# Patient Record
Sex: MALE | Race: BLACK OR AFRICAN AMERICAN | NOT HISPANIC OR LATINO | Employment: FULL TIME | URBAN - METROPOLITAN AREA
[De-identification: names, ages, dates, MRNs, and addresses within clinical notes are randomized per-mention and may not be internally consistent; named-entity substitution may affect disease eponyms.]

---

## 2018-10-16 ENCOUNTER — MEDICAL CORRESPONDENCE (OUTPATIENT)
Dept: HEALTH INFORMATION MANAGEMENT | Facility: CLINIC | Age: 43
End: 2018-10-16

## 2018-11-15 ENCOUNTER — TRANSFERRED RECORDS (OUTPATIENT)
Dept: HEALTH INFORMATION MANAGEMENT | Facility: CLINIC | Age: 43
End: 2018-11-15

## 2019-04-08 ENCOUNTER — MEDICAL CORRESPONDENCE (OUTPATIENT)
Dept: HEALTH INFORMATION MANAGEMENT | Facility: CLINIC | Age: 44
End: 2019-04-08

## 2019-04-08 ENCOUNTER — TRANSFERRED RECORDS (OUTPATIENT)
Dept: HEALTH INFORMATION MANAGEMENT | Facility: CLINIC | Age: 44
End: 2019-04-08

## 2019-04-18 ENCOUNTER — TRANSFERRED RECORDS (OUTPATIENT)
Dept: HEALTH INFORMATION MANAGEMENT | Facility: CLINIC | Age: 44
End: 2019-04-18

## 2019-04-19 ENCOUNTER — TRANSFERRED RECORDS (OUTPATIENT)
Dept: HEALTH INFORMATION MANAGEMENT | Facility: CLINIC | Age: 44
End: 2019-04-19

## 2019-04-25 ENCOUNTER — MEDICAL CORRESPONDENCE (OUTPATIENT)
Dept: HEALTH INFORMATION MANAGEMENT | Facility: CLINIC | Age: 44
End: 2019-04-25

## 2019-05-01 ENCOUNTER — PRE VISIT (OUTPATIENT)
Dept: NEUROSURGERY | Facility: CLINIC | Age: 44
End: 2019-05-01

## 2019-05-01 NOTE — TELEPHONE ENCOUNTER
"NEUROSURGERY PRE-VISIT DATA  NEUROSURGERY PRE-VISIT DATA  ON THE PHONE CALL     Date of call 05/01/2019   Date of appointment 05/09/2019   Reason for referral (match appointment comment) LBP   Who made the initial call (patient, parent [name], referral source RIGHTFAX   Name of referring provider DR. MARILYN WATSON   Has the patient been seen for the referring problem?   If yes, by whom and where    Where and what tests have been done?      Previous surgeries for the  referred condition\"?  If yes, by whom and where  *Request operative reports(s).      REQUEST MEDICAL RECORDS     From where? [facility]   From whom (Specify Primary, Neurology, Neurosurgery, Orthopedics, ENT, DDS, Pain Mgt, Medical Oncologist, Radiation Oncologist)    Date of request ? 04/08/2019   Who did you speak to? FAXED REQUEST   Are records already in [facility]?  NO   Have pertinent, outside records received?  (OR notes, study reports and provider notes) YES     IMAGES and TESTS:     Has the patient had any images done? YES   What images? (Specify MRI, CT, X-ray, Bone scan, MRA/MRV, etc)  * Include When and where? YES   What tests? (EMG, EEG, lumbar puncture, etc)  *Include when and where      Are the images in PACS? YES   If not already in PACS, have images been pushed to PAC and when?        "

## 2019-05-07 ENCOUNTER — TRANSFERRED RECORDS (OUTPATIENT)
Dept: HEALTH INFORMATION MANAGEMENT | Facility: CLINIC | Age: 44
End: 2019-05-07

## 2019-06-24 ENCOUNTER — OFFICE VISIT (OUTPATIENT)
Dept: NEUROSURGERY | Facility: CLINIC | Age: 44
End: 2019-06-24
Payer: COMMERCIAL

## 2019-06-24 VITALS
BODY MASS INDEX: 28.64 KG/M2 | RESPIRATION RATE: 16 BRPM | DIASTOLIC BLOOD PRESSURE: 90 MMHG | WEIGHT: 189 LBS | OXYGEN SATURATION: 96 % | TEMPERATURE: 98 F | HEIGHT: 68 IN | HEART RATE: 66 BPM | SYSTOLIC BLOOD PRESSURE: 138 MMHG

## 2019-06-24 DIAGNOSIS — M54.50 CHRONIC MIDLINE LOW BACK PAIN WITHOUT SCIATICA: ICD-10-CM

## 2019-06-24 DIAGNOSIS — G89.29 CHRONIC MIDLINE LOW BACK PAIN WITHOUT SCIATICA: ICD-10-CM

## 2019-06-24 PROBLEM — S42.132D: Status: ACTIVE | Noted: 2019-04-24

## 2019-06-24 RX ORDER — IBUPROFEN 600 MG/1
TABLET, FILM COATED ORAL
COMMUNITY
Start: 2019-04-18

## 2019-06-24 RX ORDER — EPINEPHRINE 0.3 MG/.3ML
INJECTION SUBCUTANEOUS
Refills: 6 | COMMUNITY
Start: 2019-03-12

## 2019-06-24 RX ORDER — AMLODIPINE BESYLATE 5 MG/1
TABLET ORAL
Refills: 12 | COMMUNITY
Start: 2018-10-30 | End: 2023-09-22

## 2019-06-24 RX ORDER — AMLODIPINE BESYLATE 10 MG/1
TABLET ORAL
Refills: 12 | COMMUNITY
Start: 2018-12-30

## 2019-06-24 RX ORDER — ATORVASTATIN CALCIUM 20 MG/1
TABLET, FILM COATED ORAL
Refills: 12 | COMMUNITY
Start: 2018-12-30

## 2019-06-24 ASSESSMENT — ENCOUNTER SYMPTOMS
NIGHT SWEATS: 1
WHEEZING: 0
SNORES LOUDLY: 0
CHILLS: 0
WEIGHT GAIN: 0
ALTERED TEMPERATURE REGULATION: 0
HALLUCINATIONS: 0
BACK PAIN: 1
WEIGHT LOSS: 0
COUGH: 1
HEMOPTYSIS: 0
ARTHRALGIAS: 0
JOINT SWELLING: 0
DYSPNEA ON EXERTION: 0
FATIGUE: 0
MUSCLE WEAKNESS: 0
POSTURAL DYSPNEA: 0
NECK PAIN: 0
FEVER: 0
MUSCLE CRAMPS: 0
DECREASED APPETITE: 0
SPUTUM PRODUCTION: 0
POLYDIPSIA: 0
INCREASED ENERGY: 0
MYALGIAS: 1
SHORTNESS OF BREATH: 0
STIFFNESS: 1
COUGH DISTURBING SLEEP: 1

## 2019-06-24 ASSESSMENT — MIFFLIN-ST. JEOR: SCORE: 1736.8

## 2019-06-24 ASSESSMENT — PAIN SCALES - GENERAL: PAINLEVEL: MODERATE PAIN (4)

## 2019-06-24 NOTE — NURSING NOTE
Chief Complaint   Patient presents with     Back Pain     [facility] NEW- LBP CONSULTATION       Torsten Manzanares, EMT

## 2019-06-24 NOTE — PROGRESS NOTES
"6/24/2019     Reason for visit: Back pain    History of present illness:  7 years ago accidential finding of lumbar vertebrae fusion. A biopsy of the the right lumbar vertebrae L3-L4 revealed bony tissue.  Today, he reports pain mostly localized to the lower back. He also reports morning stiffness and problems bending forward due to restriction of motion and pain. Occasional pain shooting down his legs b/l. He also reports muscle spasms.    He is taking ibuprofen, a muscle and smokes cannabis to alleviate back pain.     Denies numbness, tingling, weakness, changes in his bladder or bowel function.    Surgical History: Biospy, wrist surgery, ACL surgery right     Review of systems: 10 point ROS negative except for as detailed in HPI    Physical exam:   /90   Pulse 66   Temp 98  F (36.7  C) (Oral)   Resp 16   Ht 1.727 m (5' 8\")   Wt 85.7 kg (189 lb)   SpO2 96%   BMI 28.74 kg/m      General: Awake and alert and in no acute distress.  Pulm: Breathing comfortably on room air  CN: Symmetric browlift, smile, tongue protrusion, palate elevation, and sternocleidomastoids. No dysarthria. Extraocular muscles are all intact. Pupils react bilaterally and equally  Coordination: Intact finger-nose-finger bilaterally. Symmetric rapid alternating movements in bilateral upper extremities   Motor: No pronator drift. Good muscle bulk throughout. 5 out of 5 strength in bilateral upper and lower extremities  Gait: Intact tandem gait.    Motor:  Normal bulk / tone; no tremor, rigidity, or bradykinesia.  No muscle wasting or fasciculations  No Pronator Drift       Delt Bi Tri Hand Flexion/  Extension Iliopsoas Quadriceps Hamstrings Tibialis Anterior Gastroc     C5 C6 C7 C8/T1 L2 L3 L4-S1 L4 S1   R 5 5 5 5 5 5 5 5 5   L 5 5 5 5 5 5 5 5 5   Sensory:  intact to LT x 4 extremities, except numbness/tingling in right lateral leg & foot     Reflexes:       Bi Tri BR Angie Pat Ach Bab     C5-6 C7-8 C6 UMN L2-4 S1 UMN   R 2+ 2+ 2+ Norm " 1+ 1+ Norm   L 2+ 2+ 2+ Norm 1+ 1+ Norm        Imaging:  No new imaging. Thoracolumbar CT shows bony fusion of L2-L4 on the right.    Assessment:  42 y/o M w/ history of L3-4 vertebrae spontaneous fusion with increasing morning stiffness and lower back pain but neurologically intact.     Plan:  - no indication for neurosurgical intervention  - recommend to follow up with pain management clinic through his PCP      Patient seen and discussed with MD Girish Palacio MD,  Neurosurgery, PGY-1    I saw the patient with the resident.  I have reviewed and edited the resident note and agree with the plan of care.      Marty Arroyo MD       Answers for HPI/ROS submitted by the patient on 6/24/2019   General Symptoms: Yes  Skin Symptoms: No  HENT Symptoms: No  EYE SYMPTOMS: No  HEART SYMPTOMS: No  LUNG SYMPTOMS: Yes  INTESTINAL SYMPTOMS: No  URINARY SYMPTOMS: No  REPRODUCTIVE SYMPTOMS: No  SKELETAL SYMPTOMS: Yes  BLOOD SYMPTOMS: No  NERVOUS SYSTEM SYMPTOMS: No  MENTAL HEALTH SYMPTOMS: No  Fever: No  Loss of appetite: No  Weight loss: No  Weight gain: No  Fatigue: No  Night sweats: Yes  Chills: No  Increased stress: No  Excessive thirst: No  Feeling hot or cold when others believe the temperature is normal: No  Loss of height: No  Post-operative complications: No  Surgical site pain: No  Hallucinations: No  Change in or Loss of Energy: No  Hyperactivity: No  Confusion: No  Cough: Yes  Sputum or phlegm: No  Coughing up blood: No  Difficulty breating or shortness of breath: No  Snoring: No  Wheezing: No  Difficulty breathing on exertion: No  Nighttime Cough: Yes  Difficulty breathing when lying flat: No  Back pain: Yes  Muscle aches: Yes  Neck pain: No  Swollen joints: No  Joint pain: No  Bone pain: No  Muscle cramps: No  Muscle weakness: No  Joint stiffness: Yes  Bone fracture: No

## 2019-06-24 NOTE — LETTER
"6/24/2019       RE: Dayday Hester  [address]  [place] [facility] [address]     Dear Colleague,    Thank you for referring your patient, Dayday Hester, to the [facility] NEUROSURGERY at [facility]. Please see a copy of my visit note below.    6/24/2019     Reason for visit: Back pain    History of present illness:  7 years ago accidential finding of lumbar vertebrae fusion. A biopsy of the the right lumbar vertebrae L3-L4 revealed bony tissue.  Today, he reports pain mostly localized to the lower back. He also reports morning stiffness and problems bending forward due to restriction of motion and pain. Occasional pain shooting down his legs b/l. He also reports muscle spasms.    He is taking ibuprofen, a muscle and smokes cannabis to alleviate back pain.     Denies numbness, tingling, weakness, changes in his bladder or bowel function.    Surgical History: Biospy, wrist surgery, ACL surgery right     Review of systems: 10 point ROS negative except for as detailed in HPI    Physical exam:   /90   Pulse 66   Temp 98  F (36.7  C) (Oral)   Resp 16   Ht 1.727 m (5' 8\")   Wt 85.7 kg (189 lb)   SpO2 96%   BMI 28.74 kg/m       General: Awake and alert and in no acute distress.  Pulm: Breathing comfortably on room air  CN: Symmetric browlift, smile, tongue protrusion, palate elevation, and sternocleidomastoids. No dysarthria. Extraocular muscles are all intact. Pupils react bilaterally and equally  Coordination: Intact finger-nose-finger bilaterally. Symmetric rapid alternating movements in bilateral upper extremities   Motor: No pronator drift. Good muscle bulk throughout. 5 out of 5 strength in bilateral upper and lower extremities  Gait: Intact tandem gait.    Motor:  Normal bulk / tone; no tremor, rigidity, or bradykinesia.  No muscle wasting or fasciculations  No Pronator Drift       Delt Bi Tri Hand Flexion/  Extension Iliopsoas Quadriceps Hamstrings Tibialis " Anterior Gastroc     C5 C6 C7 C8/T1 L2 L3 L4-S1 L4 S1   R 5 5 5 5 5 5 5 5 5   L 5 5 5 5 5 5 5 5 5   Sensory:  intact to LT x 4 extremities, except numbness/tingling in right lateral leg & foot     Reflexes:       Bi Tri BR Angie Pat Ach Bab     C5-6 C7-8 C6 UMN L2-4 S1 UMN   R 2+ 2+ 2+ Norm 1+ 1+ Norm   L 2+ 2+ 2+ Norm 1+ 1+ Norm        Imaging:  No new imaging. Thoracolumbar CT shows bony fusion of L2-L4 on the right.    Assessment:  42 y/o M w/ history of L3-4 vertebrae spontaneous fusion with increasing morning stiffness and lower back pain but neurologically intact.     Plan:  - no indication for neurosurgical intervention  - recommend to follow up with pain management clinic through his PCP    Patient seen and discussed with MD Girish Palacio MD,  Neurosurgery, PGY-1    I saw the patient with the resident.  I have reviewed and edited the resident note and agree with the plan of care.      Marty Arroyo MD

## 2020-03-17 ENCOUNTER — TRANSFERRED RECORDS (OUTPATIENT)
Dept: HEALTH INFORMATION MANAGEMENT | Facility: CLINIC | Age: 45
End: 2020-03-17

## 2020-03-23 ENCOUNTER — TRANSCRIBE ORDERS (OUTPATIENT)
Dept: OTHER | Age: 45
End: 2020-03-23

## 2020-03-23 ENCOUNTER — TELEPHONE (OUTPATIENT)
Dept: ORTHOPEDICS | Facility: CLINIC | Age: 45
End: 2020-03-23

## 2020-03-23 DIAGNOSIS — M25.512 SHOULDER PAIN, LEFT: Primary | ICD-10-CM

## 2020-03-23 NOTE — TELEPHONE ENCOUNTER
"[facility] Call Center    Phone Message    May a detailed message be left on voicemail: yes     Reason for Call: Patient calling in to schedule a referral placed by his PCP for \"broken left shoulder bone\". Writer did not see referral placed along with no imaging in chart. Patient stating that he completed Xrays at [facility] on 3/17/2020. Per Scott on priority line we need images released to us so we are able to view images as we are only seeing severe patient's at this time due to COVID-19 protocols. Writer relayed message to patient and patient asking if he was able to complete images at our location today as he was wanting to schedule surgery. Writer informed caller that he would need to call [facility] to release those images to us so we could review those first. Caller hung up on writer middle of call. FYI Ortho.    Thank you, Cristine Santana on 3/23/2020 at 9:19 AM       Action Taken: Message routed to:  [facility] ORTHO    Travel Screening: Not Applicable                                                                      "

## 2020-03-24 ENCOUNTER — TELEPHONE (OUTPATIENT)
Dept: ORTHOPEDICS | Facility: CLINIC | Age: 45
End: 2020-03-24

## 2020-03-24 NOTE — TELEPHONE ENCOUNTER
I called patient to let him know that he can be scheduled for a telephone visit with Dr. Berger tomorrow at 1:30pm. The patient stated that 1:30pm works for him and he can be reached via [phone]. The patient stated that he understood and had no further questions.     ANNA Bell

## 2020-03-25 ENCOUNTER — OFFICE VISIT (OUTPATIENT)
Dept: ORTHOPEDICS | Facility: CLINIC | Age: 45
End: 2020-03-25
Payer: COMMERCIAL

## 2020-03-25 ENCOUNTER — ANCILLARY PROCEDURE (OUTPATIENT)
Dept: GENERAL RADIOLOGY | Facility: CLINIC | Age: 45
End: 2020-03-25
Attending: PREVENTIVE MEDICINE
Payer: COMMERCIAL

## 2020-03-25 ENCOUNTER — VIRTUAL VISIT (OUTPATIENT)
Dept: ORTHOPEDICS | Facility: CLINIC | Age: 45
End: 2020-03-25
Payer: COMMERCIAL

## 2020-03-25 VITALS
SYSTOLIC BLOOD PRESSURE: 138 MMHG | HEIGHT: 70 IN | BODY MASS INDEX: 28.49 KG/M2 | DIASTOLIC BLOOD PRESSURE: 89 MMHG | WEIGHT: 199 LBS | HEART RATE: 62 BPM

## 2020-03-25 DIAGNOSIS — M54.2 NECK PAIN: ICD-10-CM

## 2020-03-25 DIAGNOSIS — M25.512 ACUTE PAIN OF LEFT SHOULDER: Primary | ICD-10-CM

## 2020-03-25 DIAGNOSIS — S43.102A AC SEPARATION, LEFT, INITIAL ENCOUNTER: Primary | ICD-10-CM

## 2020-03-25 DIAGNOSIS — M25.512 ACUTE PAIN OF LEFT SHOULDER: ICD-10-CM

## 2020-03-25 DIAGNOSIS — R20.0 NUMBNESS OF LEFT HAND: ICD-10-CM

## 2020-03-25 DIAGNOSIS — M54.12 CERVICAL RADICULAR PAIN: ICD-10-CM

## 2020-03-25 RX ORDER — LIDOCAINE 4 G/G
1 PATCH TOPICAL EVERY 24 HOURS
Qty: 6 PATCH | Refills: 3 | Status: SHIPPED | OUTPATIENT
Start: 2020-03-25 | End: 2023-09-22

## 2020-03-25 RX ORDER — GABAPENTIN 300 MG/1
300 CAPSULE ORAL 3 TIMES DAILY
Qty: 90 CAPSULE | Refills: 1 | Status: SHIPPED | OUTPATIENT
Start: 2020-03-25

## 2020-03-25 RX ORDER — METHYLPREDNISOLONE 4 MG
TABLET, DOSE PACK ORAL
Qty: 21 TABLET | Refills: 0 | Status: SHIPPED | OUTPATIENT
Start: 2020-03-25 | End: 2023-09-22

## 2020-03-25 ASSESSMENT — MIFFLIN-ST. JEOR: SCORE: 1800.97
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
## 2020-03-25 NOTE — LETTER
March 25, 2020      RE: Dayday Hester  [address]  [address]  [place] [address]        To whom it may concern:    Dayday Hester is under my professional care for    Acute pain of left shoulder  Numbness of left hand  Neck pain  Cervical radicular pain He  may return to work with the following: The employee is UNABLE to return to work until I re-evaluate him on or about     When the patient returns to work, the following restrictions apply until April 7th, 2020:  NO Reach Above Shoulders: Not at all (0 hours)  NO Lift, carry, push, and pull   Unable to perform any physical work.      Sincerely,      Minor Berger MD

## 2020-03-25 NOTE — TELEPHONE ENCOUNTER
Patient has a telephone visit with Dr Berger today.  Images will be reviewed and treatment plan will be discussed.
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
## 2020-03-31 ENCOUNTER — VIRTUAL VISIT (OUTPATIENT)
Dept: ORTHOPEDICS | Facility: CLINIC | Age: 45
End: 2020-03-31
Payer: COMMERCIAL

## 2020-03-31 DIAGNOSIS — M54.50 CHRONIC MIDLINE LOW BACK PAIN WITHOUT SCIATICA: Primary | ICD-10-CM

## 2020-03-31 DIAGNOSIS — G89.29 CHRONIC MIDLINE LOW BACK PAIN WITHOUT SCIATICA: Primary | ICD-10-CM

## 2020-03-31 DIAGNOSIS — S43.102S AC SEPARATION, LEFT, SEQUELA: ICD-10-CM

## 2020-03-31 NOTE — PROGRESS NOTES
"Dayday Hester is a 44 year old male who is being evaluated via a billable telephone visit.      The patient has been notified of following:   After review of patient's medical issues this visit was conducted over the phone, as opposed to in person, in effort to reduce risk of COVID-19 exposure.  \"This telephone visit will be conducted via a call between you and your physician/provider. We have found that certain health care needs can be provided without the need for a physical exam.  This service lets us provide the care you need with a short phone conversation.  If a prescription is necessary we can send it directly to your pharmacy.  If lab work is needed we can place an order for that and you can then stop by our lab to have the test done at a later time.    If during the course of the call the physician/provider feels a telephone visit is not appropriate, you will not be charged for this service.\"     Patient has given verbal consent for Telephone visit?  Yes    Dayday Hester complains of  Shoulder pain and tingling in arm  Chief Complaint   Patient presents with     RECHECK     L shoulder follow up       I have reviewed and updated the patient's Past Medical History, Social History, Family History and Medication List.    ALLERGIES  Bee venom; Bees; and Food    Additional provider notes:  Dayday is following up for his left shoulder pain and neck pain and tingling and numbness in left arm  He states that the medications I prescribed are helping, but still having discomfort  He wants to know when he should followup and consider returning to work      43 yo male with left shoulder AC separation, and left arm radicular pain from neck cervical disc herniation  Given voltaren bid  Start medrol pack, gabapentin and tizanadine, has not picked up medications yet  F/u in person next week for exam and possible AC joint injection    Phone call duration: 11 minutes    Phone call start time: 5:05pm  Phone call end " time: 5:16pm      Minor Berger MD

## 2020-04-02 RX ORDER — DICLOFENAC SODIUM 75 MG/1
75 TABLET, DELAYED RELEASE ORAL 2 TIMES DAILY
Qty: 40 TABLET | Refills: 1 | Status: SHIPPED | OUTPATIENT
Start: 2020-04-02

## 2021-05-29 ENCOUNTER — RECORDS - HEALTHEAST (OUTPATIENT)
Dept: ADMINISTRATIVE | Facility: CLINIC | Age: 46
End: 2021-05-29

## 2021-06-18 DIAGNOSIS — M54.2 NECK PAIN: ICD-10-CM

## 2021-06-18 DIAGNOSIS — M54.12 CERVICAL RADICULAR PAIN: ICD-10-CM

## 2021-06-18 NOTE — TELEPHONE ENCOUNTER
methylPREDNISolone (MEDROL DOSEPAK) 4 MG tablet therapy pack 21 tablet 0 3/25/2020  --   Sig: Follow Package Directions   Sent to pharmacy as: methylPREDNISolone (MEDROL DOSEPAK) 4 MG tablet therapy pack   Class: E-Prescribe   Order: [id number]   E-Prescribing Status: Receipt confirmed by pharmacy (3/25/2020  3:43 PM CDT)      Disp Refills Start End ANGELIKA   tiZANidine (ZANAFLEX) 4 MG tablet 45 tablet 1 3/25/2020  --   Sig - Route: Take 1-2 tablets (4-8 mg) by mouth nightly as needed for muscle spasms - Oral   Sent to pharmacy as: tiZANidine (ZANAFLEX) 4 MG tablet   Class: E-Prescribe   Order: [id number]   E-Prescribing Status: Receipt confirmed by pharmacy (3/25/2020  3:43 PM CDT)

## 2021-07-14 RX ORDER — METHYLPREDNISOLONE 4 MG
TABLET, DOSE PACK ORAL
Qty: 21 TABLET | Refills: 0 | OUTPATIENT
Start: 2021-07-14

## 2021-07-14 NOTE — TELEPHONE ENCOUNTER
Needs appointment, for refills,  not sure if he even picked up these medications in the first place. Thanks

## 2022-06-16 ENCOUNTER — TELEPHONE (OUTPATIENT)
Dept: DERMATOLOGY | Facility: CLINIC | Age: 47
End: 2022-06-16
Payer: COMMERCIAL

## 2022-06-16 NOTE — TELEPHONE ENCOUNTER
Called back medical records at clinic to let them know that we need records related to dermatology issues. Had to leave message with pt info for them to fax to our clinic.   Faustina LI RN BSN PHN  Specialty Clinics

## 2022-06-16 NOTE — TELEPHONE ENCOUNTER
[facility] Call Center    Phone Message    May a detailed message be left on voicemail: yes     Reason for Call: Pt was scheduled for a virtual Appt and was asked to send over medical Recs.   Laura from [facility] is asking what Recs needs to be sent over?   Please call Laura back between 8-5. Thanks     Action Taken: Message routed to:  [facility] ([facility] Derm    Travel Screening: Not Applicable

## 2022-07-30 ENCOUNTER — HEALTH MAINTENANCE LETTER (OUTPATIENT)
Age: 47
End: 2022-07-30

## 2022-10-10 ENCOUNTER — HEALTH MAINTENANCE LETTER (OUTPATIENT)
Age: 47
End: 2022-10-10

## 2022-11-21 ENCOUNTER — OFFICE VISIT (OUTPATIENT)
Dept: DERMATOLOGY | Facility: CLINIC | Age: 47
End: 2022-11-21
Payer: COMMERCIAL

## 2022-11-21 DIAGNOSIS — L80 VITILIGO: Primary | ICD-10-CM

## 2022-11-21 PROCEDURE — 99203 OFFICE O/P NEW LOW 30 MIN: CPT | Performed by: DERMATOLOGY

## 2022-11-21 RX ORDER — HYDROCORTISONE 2.5 %
CREAM (GRAM) TOPICAL 2 TIMES DAILY
Qty: 30 G | Refills: 4 | Status: SHIPPED | OUTPATIENT
Start: 2022-11-21

## 2022-11-21 ASSESSMENT — PAIN SCALES - GENERAL: PAINLEVEL: NO PAIN (0)

## 2022-11-21 NOTE — PROGRESS NOTES
[facility] Dermatology Note    Encounter Date: Nov 21, 2022    Dermatology Problem List:  1. Vitiligo: affecting right forehead, brow, eyelid with poliosis  - hydrocortisone 2.5% cream  - in reserve: CNi, Excimer/nbUVB, ruxolitinib  ______________________________________    Impression/Plan:  1. Vitiligo: affecting right forehead, brow, eyelid with poliosis of eyebrow. Discussed what is know about pathophysiology and natural history, including unpredictability of exacerbations/progression. Discussed risks/benefits of treatment including topicals, phototherapy, and systemic agents. Will start with topicals.  - hydrocortisone 2.5% cream BID      Follow-up in 2-3 months.       Staff Involved:  Staff Only    Minor Moulton MD   of Dermatology  Department of Dermatology  [facility]      CC:   Chief Complaint   Patient presents with     Derm Problem     Dayday is here today for vitiligo of the right eye lid and forehead        History of Present Illness:  Mr. Dayday Hester is a 47 year old male who presents as a new patient.    Discoloration - white spots - started on forehead and eyelid 2 years ago  - then more spots, then grew together  - history of eczema when younger - this went away as grew older  - very sensitive - sunburned in area - then dry and hard, becomes red - becomes firm and scabs, peels  - no prior treatment    Prior tinea versicolor - treated with sulfur wash    Labs:  N/A    Physical exam:  Vitals: There were no vitals taken for this visit.  GEN: This is a well developed, well-nourished male in no acute distress, in a pleasant mood.    SKIN: Flaherty phototype V  - Focused examination of the face was performed.  - depigmented patch on the right medial forehead with extension over the brow (with poliosis) and onto the upper eyelid  - No other lesions of concern on areas examined.     Past Medical History:   No past medical  history on file.  No past surgical history on file.    Social History:   reports that he has been smoking cigarettes. He has been smoking an average of 1 pack per day. He has never used smokeless tobacco. He reports that he does not currently use alcohol. He reports that he does not currently use drugs.    Family History:  No family history on file.    Medications:  Current Outpatient Medications   Medication Sig Dispense Refill     amLODIPine (NORVASC) 10 MG tablet TAKE 1 TABLET BY MOUTH EVERY DAY FOR BLOOD PRESSURE  12     amLODIPine (NORVASC) 5 MG tablet TAKE 1 TABLET BY MOUTH EVERY DAY FOR BLOOD PRESSURE  12     Amphetamine-Dextroamphetamine (ADDERALL PO) Take 25 mg by mouth daily       atorvastatin (LIPITOR) 20 MG tablet TAKE 1 TABLET BY MOUTH DAILY FOR CHOLESTEROL.  12     diclofenac (VOLTAREN) 75 MG EC tablet Take 1 tablet (75 mg) by mouth 2 times daily 40 tablet 1     EPINEPHrine (EPIPEN/ADRENACLICK/OR ANY BX GENERIC EQUIV) 0.3 MG/0.3ML injection 2-pack INJECT 1 PEN AS NEEDED FOR ALLERGIC REACTION. MAY REPEAT ONCE IN 5 MONUTES IF SYMPTOMS PERSIST  6     gabapentin (NEURONTIN) 300 MG capsule Take 1 capsule (300 mg) by mouth 3 times daily 90 capsule 1     ibuprofen (ADVIL/MOTRIN) 600 MG tablet        Lidocaine (LIDOCARE) 4 % Patch Place 1 patch onto the skin every 24 hours To prevent lidocaine toxicity, patient should be patch free for 12 hrs daily. 6 patch 3     lidocaine (LIDODERM) 5 % patch Place 1 patch onto the skin every 24 hours May substitute Lidocaine topical ointment if PA needed or denied. (Patient not taking: Reported on 6/24/2019) 30 patch 0     methylPREDNISolone (MEDROL DOSEPAK) 4 MG tablet therapy pack Follow Package Directions 21 tablet 0     tiZANidine (ZANAFLEX) 4 MG tablet Take 1-2 tablets (4-8 mg) by mouth nightly as needed for muscle spasms 45 tablet 1     UNABLE TO FIND MEDICATION NAME: High blood pressure medication. 25 mg, 2 tablets QD.       Allergies   Allergen Reactions     Bee  Venom Anaphylaxis     Bees      Food Rash

## 2022-11-21 NOTE — PATIENT INSTRUCTIONS
[email or web address]    [email or web address]    [employer] Vitiligo Support Group  [place] Chapter Leader  email: [email or web address]  [email or web address]  [email or web address]  [phone] ext [phone]

## 2022-11-21 NOTE — LETTER
11/21/2022       RE: Dayday Hester  [address]  [facility] [address]     Dear Colleague,    Thank you for referring your patient, Dayday Hester, to the [facility] DERMATOLOGY CLINIC [place] at [facility]. Please see a copy of my visit note below.    [facility] Dermatology Note    Encounter Date: Nov 21, 2022    Dermatology Problem List:  1. Vitiligo: affecting right forehead, brow, eyelid with poliosis  - hydrocortisone 2.5% cream  - in reserve: CNi, Excimer/nbUVB, ruxolitinib  ______________________________________    Impression/Plan:  1. Vitiligo: affecting right forehead, brow, eyelid with poliosis of eyebrow. Discussed what is know about pathophysiology and natural history, including unpredictability of exacerbations/progression. Discussed risks/benefits of treatment including topicals, phototherapy, and systemic agents. Will start with topicals.  - hydrocortisone 2.5% cream BID      Follow-up in 2-3 months.       Staff Involved:  Staff Only    Minor Moulton MD   of Dermatology  Department of Dermatology  [facility]      CC:   Chief Complaint   Patient presents with     Derm Problem     Dayday is here today for vitiligo of the right eye lid and forehead        History of Present Illness:  Mr. Dayday Hester is a 47 year old male who presents as a new patient.    Discoloration - white spots - started on forehead and eyelid 2 years ago  - then more spots, then grew together  - history of eczema when younger - this went away as grew older  - very sensitive - sunburned in area - then dry and hard, becomes red - becomes firm and scabs, peels  - no prior treatment    Prior tinea versicolor - treated with sulfur wash    Labs:  N/A    Physical exam:  Vitals: There were no vitals taken for this visit.  GEN: This is a well developed, well-nourished male in no acute  distress, in a pleasant mood.    SKIN: Flaherty phototype V  - Focused examination of the face was performed.  - depigmented patch on the right medial forehead with extension over the brow (with poliosis) and onto the upper eyelid  - No other lesions of concern on areas examined.     Past Medical History:   No past medical history on file.  No past surgical history on file.    Social History:   reports that he has been smoking cigarettes. He has been smoking an average of 1 pack per day. He has never used smokeless tobacco. He reports that he does not currently use alcohol. He reports that he does not currently use drugs.    Family History:  No family history on file.    Medications:  Current Outpatient Medications   Medication Sig Dispense Refill     amLODIPine (NORVASC) 10 MG tablet TAKE 1 TABLET BY MOUTH EVERY DAY FOR BLOOD PRESSURE  12     amLODIPine (NORVASC) 5 MG tablet TAKE 1 TABLET BY MOUTH EVERY DAY FOR BLOOD PRESSURE  12     Amphetamine-Dextroamphetamine (ADDERALL PO) Take 25 mg by mouth daily       atorvastatin (LIPITOR) 20 MG tablet TAKE 1 TABLET BY MOUTH DAILY FOR CHOLESTEROL.  12     diclofenac (VOLTAREN) 75 MG EC tablet Take 1 tablet (75 mg) by mouth 2 times daily 40 tablet 1     EPINEPHrine (EPIPEN/ADRENACLICK/OR ANY BX GENERIC EQUIV) 0.3 MG/0.3ML injection 2-pack INJECT 1 PEN AS NEEDED FOR ALLERGIC REACTION. MAY REPEAT ONCE IN 5 MONUTES IF SYMPTOMS PERSIST  6     gabapentin (NEURONTIN) 300 MG capsule Take 1 capsule (300 mg) by mouth 3 times daily 90 capsule 1     ibuprofen (ADVIL/MOTRIN) 600 MG tablet        Lidocaine (LIDOCARE) 4 % Patch Place 1 patch onto the skin every 24 hours To prevent lidocaine toxicity, patient should be patch free for 12 hrs daily. 6 patch 3     lidocaine (LIDODERM) 5 % patch Place 1 patch onto the skin every 24 hours May substitute Lidocaine topical ointment if PA needed or denied. (Patient not taking: Reported on 6/24/2019) 30 patch 0     methylPREDNISolone (MEDROL  DOSEPAK) 4 MG tablet therapy pack Follow Package Directions 21 tablet 0     tiZANidine (ZANAFLEX) 4 MG tablet Take 1-2 tablets (4-8 mg) by mouth nightly as needed for muscle spasms 45 tablet 1     UNABLE TO FIND MEDICATION NAME: High blood pressure medication. 25 mg, 2 tablets QD.       Allergies   Allergen Reactions     Bee Venom Anaphylaxis     Bees      Food Rash

## 2022-11-21 NOTE — NURSING NOTE
Dermatology Rooming Note    Dayday Hester's goals for this visit include:   Chief Complaint   Patient presents with     Derm Problem     Dayday is here today for vitiligo of the right eye lid and forehead      Roberto Eller CMA

## 2023-08-19 ENCOUNTER — HEALTH MAINTENANCE LETTER (OUTPATIENT)
Age: 48
End: 2023-08-19

## 2023-09-22 ENCOUNTER — HOSPITAL ENCOUNTER (EMERGENCY)
Facility: CLINIC | Age: 48
Discharge: OTHER INSTITUTION | End: 2023-09-22
Attending: EMERGENCY MEDICINE | Admitting: EMERGENCY MEDICINE
Payer: COMMERCIAL

## 2023-09-22 VITALS
TEMPERATURE: 98.2 F | HEIGHT: 68 IN | BODY MASS INDEX: 32.89 KG/M2 | HEART RATE: 86 BPM | OXYGEN SATURATION: 99 % | RESPIRATION RATE: 18 BRPM | WEIGHT: 217 LBS | DIASTOLIC BLOOD PRESSURE: 112 MMHG | SYSTOLIC BLOOD PRESSURE: 178 MMHG

## 2023-09-22 DIAGNOSIS — I10 HYPERTENSION, UNSPECIFIED TYPE: ICD-10-CM

## 2023-09-22 DIAGNOSIS — F43.20 ADJUSTMENT DISORDER, UNSPECIFIED TYPE: ICD-10-CM

## 2023-09-22 DIAGNOSIS — F12.20 TETRAHYDROCANNABINOL (THC) DEPENDENCE (H): ICD-10-CM

## 2023-09-22 DIAGNOSIS — F16.20: ICD-10-CM

## 2023-09-22 LAB
AMPHETAMINES UR QL SCN: ABNORMAL
BARBITURATES UR QL SCN: ABNORMAL
BENZODIAZ UR QL SCN: ABNORMAL
BZE UR QL SCN: ABNORMAL
CANNABINOIDS UR QL SCN: ABNORMAL
FENTANYL UR QL: ABNORMAL
OPIATES UR QL SCN: ABNORMAL
PCP QUAL URINE (ROCHE): ABNORMAL

## 2023-09-22 PROCEDURE — 99284 EMERGENCY DEPT VISIT MOD MDM: CPT | Mod: GC | Performed by: EMERGENCY MEDICINE

## 2023-09-22 PROCEDURE — 80307 DRUG TEST PRSMV CHEM ANLYZR: CPT | Performed by: EMERGENCY MEDICINE

## 2023-09-22 PROCEDURE — 99283 EMERGENCY DEPT VISIT LOW MDM: CPT | Performed by: EMERGENCY MEDICINE

## 2023-09-22 ASSESSMENT — ACTIVITIES OF DAILY LIVING (ADL)
ADLS_ACUITY_SCORE: 35

## 2023-09-22 NOTE — CONSULTS
Attempted to meet with patient.  Upon entering the room patient was lying sleeping in the dark.  Staff attempted to verbally awaken him getting progressively louder to no avail.  Patient appeared to be sleeping very hard.  Patient doesn't appear able to discuss treatment options at this time or complete an assessment at this time.  Consult will be closed at this time.  If patient remains in the hospital/detox by Monday please re-place consult when he is awake and alert.        [facility] department does not do evals on the weekend and schedule is full for today.    Pt will be seen Monday or Tuesday for CD eval. If pt discharges this weekend, pt can call Mental Health and Addiction Services Line: [phone] and make an appt through [employer] for an evaluation and referrals to CD treatment.    PEARL Dsouza on 9/22/2023 at 1:26 PM

## 2023-09-22 NOTE — CONSULTS
Diagnostic Evaluation Consultation  Crisis Assessment    Patient Name: Dayday Hester  Age:  47 year old  Legal Sex: male  Gender Identity: male  Pronouns:   Race: Black or   Ethnicity: Not  or   Language: English      Patient was assessed: In person      Patient location: [facility] EMERGENCY DEPARTMENT                             ED17    Referral Data and Chief Complaint  Dayday Hester presents to the ED by  self. Patient is presenting to the ED for the following concerns: Substance use.   Factors that make the mental health crisis life threatening or complex are:  Pt reports daily use of marijuana and PCP, amount unknown. Pt states that he has been using for years.  Pt denies any hx of MH or CD TX.  He denies SI/SIB/HI, hallucinations and denies any past suicide attempts.  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  He reports that his brother is a support to him.  He states that he feels safe to go to his grandmother's for the weekend and will be able to stay sober there.  He wishes to have referral to CD assessment.  Pt is safe to discharge at this time and will be given referral and appointment for a [facility] assessment..      Informed Consent and Assessment Methods  Explained the crisis assessment process, including applicable information disclosures and limits to confidentiality, assessed understanding of the process, and obtained consent to proceed with the assessment.  Assessment methods included conducting a formal interview with patient, review of medical records, collaboration with medical staff, and obtaining relevant collateral information from family and community providers when available.  : done     Patient response to interventions: eager to participate  Coping skills were attempted to reduce the crisis:  Pt noted support people and motivation for TX.     History of the Crisis   Kulwinder show one ED visit in 2020 when he was brought in  by EMS after he was found high on PCP and  threatening that he was going to jump off a bridge.   PT detoxed in the ED and then was safe to discharge the next day.   Pt denies any family hx of mental health and stated that he has family members who use drugs but was uncertained about addiction.    Brief Psychosocial History  Family:  Single, Children    Support System:  Grandparent(s), Sibling(s)  Employment Status:  self-employed  Source of Income:  salary/wages  Financial Environmental Concerns:  No concerns identified  Current Hobbies:     Barriers in Personal Life:       Significant Clinical History  Current Anxiety Symptoms:     Current Depression/Trauma:     Current Somatic Symptoms:     Current Psychosis/Thought Disturbance:     Current Eating Symptoms:     Chemical Use History:  Alcohol: None  Marijuana: Daily  Last Use:: 09/22/23  Other Use: Other (comments) (PCP, daily use for many years)  Last Use:: 09/22/23   Past diagnosis:  Substance Use Disorder  Family history:  No known history of mental health or chemical health concerns  Past treatment:  No known formal treatment attempts  Details of most recent treatment:  Pt denies any history of MH or CD tx  Other relevant history:          Collateral Information  Is there collateral information: No (Attempted to reach Pt's mother.)     Collateral information name, relationship, phone number:       What happened today:       What is different about patient's functioning:       Concern about alcohol/drug use:      What do you think the patient needs:      Has patient made comments about wanting to kill themselves/others:      If d/c is recommended, can they take part in safety/aftercare planning:       Additional collateral information:        Risk Assessment  [place] Suicide Severity Rating Scale Full Clinical Version:  Suicidal Ideation  Q1 Wish to be Dead (Lifetime): No  Q2 Non-Specific Active Suicidal Thoughts (Lifetime): No     Suicidal Behavior  (Lifetime)  Actual Attempt (Lifetime): No  Has subject engaged in non-suicidal self-injurious behavior? (Lifetime): No  Interrupted Attempts (Lifetime): No  Aborted or Self-Interrupted Attempt (Lifetime): No  Preparatory Acts or Behavior (Lifetime): No    [place] Suicide Severity Rating Scale Recent:              Environmental or Psychosocial Events: ongoing abuse of substances  Protective Factors: Protective Factors: strong bond to family unit, community support, or employment, lives in a responsibly safe and stable environment, able to access care without barriers    Does the patient have thoughts of harming others? Feels Like Hurting Others: no  Previous Attempt to Hurt Others: no  Is the patient engaging in sexually inappropriate behavior?: no    Is the patient engaging in sexually inappropriate behavior?  no        Mental Status Exam   Affect: Appropriate  Appearance: Appropriate  Attention Span/Concentration: Attentive  Eye Contact: Engaged    Fund of Knowledge: Appropriate   Language /Speech Content: Fluent  Language /Speech Volume: Normal, Other (please comment) (Pt is having a stutter, unknown if this is drug induced or normal presentation.)  Language /Speech Rate/Productions: Normal  Recent Memory: Intact  Remote Memory: Intact  Mood:    Orientation to Person: Yes   Orientation to Place: Yes  Orientation to Time of Day: Yes  Orientation to Date: Yes     Situation (Do they understand why they are here?): Yes  Psychomotor Behavior: Normal  Thought Content: Clear  Thought Form: Goal Directed, Intact     Mini-Cog Assessment  Number of Words Recalled:    Clock-Drawing Test:     Three Item Recall:    Mini-Cog Total Score:       Medication  Psychotropic medications:   Medication Orders - Psychiatric (From admission, onward)      None             Current Care Team  Patient Care Team:  [facility] as PCP - General  Self, Kassi, MD as Tereza Grimes PA-C as Physician  Assistant (Dermatology)  Minor Moulton MD as Assigned Surgical Provider    Diagnosis  Patient Active Problem List   Diagnosis Code    Acute kidney injury (H) N17.9    Adjustment disorder with mixed anxiety and depressed mood F43.23    Cellulitis L03.90    Closed displaced fracture of coracoid process of left shoulder with routine healing S42.132D    Collapse R55    Elevated troponin R77.8    Encephalopathy acute G93.40    Essential hypertension I10    Hyperlipidemia E78.5    Hypertensive emergency I16.1    Hypokalemia E87.6    Mantoux: positive R76.11    Muscle spasm M62.838    Tobacco abuse Z72.0    Chronic midline low back pain without sciatica M54.50, G89.29       Primary Problem This Admission  Active Hospital Problems    Adjustment disorder with mixed anxiety and depressed mood        Clinical Summary and Substantiation of Recommendations   Pt came to ED requesting services for CD TX.  PT denies any MH HX, SI/HI/SIB and denies any prior CD TX HX.  Pt states he feels safe to go home and resides with his grandma which is a safe environment.  Pt was given an appointment at [facility] for a Substance Use Disorder assessment appointment on Wednesday, 10/4 at 8:00AM                          Patient coping skills attempted to reduce the crisis:  Pt noted support people and motivation for TX.    Disposition  Recommended disposition: Rule 25/[facility] Assessment        Reviewed case and recommendations with attending provider. Attending Name: Dr. Robe MD       Attending concurs with disposition: yes       Patient and/or validated legal guardian concurs with disposition:   yes       Final disposition:  discharge    Legal status on admission:      Assessment Details   Total duration spent on the patient case in minutes: 30 min     CPT code(s) utilized: 30529 - Psychotherapy for Crisis - 60 (30-74*) min    Kathi Reyes Psychotherapist  [facility] - Triage & Transition Services  Callback: [phone]

## 2023-09-22 NOTE — ED NOTES
The writer left a VM for Amie Hester ([phone]), the pt's mother, for collateral information.

## 2023-09-22 NOTE — ED PROVIDER NOTES
ED Provider Note  [facility]      History     Chief Complaint   Patient presents with    Addiction Problem     Patient present seeking help with addiction to PCP and marijuana. Patient last used today at 0900. Patient is having difficulty finding words and stuttering. Patient denies alcohol use or tobacco use. Patient denies history of seizures.Patient reports using PCP daily.      MELANIE Hester is a 47 year old male with a history of hypertension and poly substance use who presents seeking help for marijuana and  PCP use. The patient was used PCP at 0900 hours today. He has a history of difficult finding words and stuttering at baseline which is worse with PCP use. PCP intake is nearly everyday, makes him feel high and euphoric but no withdrawal effects. Marijuana use is every day. Unclear volumes ingested of either: tends to use whatever he has available. This habit has caused issues with work and relationships, previously homeless. Reports no current employment. Patient feels fine and comfortable. Family (mother/father/brother) is his support network and wants him sober.     Denies alcohol, opioid, IV DU, and other substances  Denies fevers, chills, headaches, chest pain, cough, shortness of breath, abdominal pain, nausea, vomiting, arthralgias, myalgias, rashes, dysuria. Denies SI/ HI  Did detox as a teen for marijuana.       Past Medical History  History reviewed. No pertinent past medical history.  History reviewed. No pertinent surgical history.  amLODIPine (NORVASC) 10 MG tablet  atorvastatin (LIPITOR) 20 MG tablet  diclofenac (VOLTAREN) 75 MG EC tablet  gabapentin (NEURONTIN) 300 MG capsule  hydrocortisone 2.5 % cream  tiZANidine (ZANAFLEX) 4 MG tablet  Amphetamine-Dextroamphetamine (ADDERALL PO)  EPINEPHrine (EPIPEN/ADRENACLICK/OR ANY BX GENERIC EQUIV) 0.3 MG/0.3ML injection 2-pack  ibuprofen (ADVIL/MOTRIN) 600 MG tablet  UNABLE TO FIND      Allergies   Allergen  "Reactions    Bee Venom Anaphylaxis    Bees     Food Rash     Family History  History reviewed. No pertinent family history.  Social History   Social History     Tobacco Use    Smoking status: Former     Packs/day: 1.00     Types: Cigarettes    Smokeless tobacco: Never   Substance Use Topics    Alcohol use: Not Currently    Drug use: Yes     Types: PCP         A medically appropriate review of systems was performed with pertinent positives and negatives noted in the HPI, and all other systems negative.    Physical Exam   BP: (!) 165/113  Pulse: 76  Temp: 98.2  F (36.8  C)  Resp: 16  Height: 172.7 cm (5' 8\")  Weight: 98.4 kg (217 lb)  SpO2: 97 %  Physical Exam  Vitals and nursing note reviewed.   Constitutional:       General: He is not in acute distress.     Appearance: He is normal weight.   Neurological:      Mental Status: He is alert.       General: Disheveled, lying in bed comfortably, NAD  HENT: atraumatic, normocephalic, EOMI, PEERL  Pulm: NLB, CTAB  CV: RRR w/o M/R/G, 2+ pulses bilaterally, non diaphoretic skin  GI: soft, normal bowel sounds, non distended, non tender  Neuro: Awake, alert and oriented. CN II - XII intact. Strength, tone, coordination intact. B/l Biceps DTRs intact. Symmetric face. Slurred speech. Non tremulous.  Psych: Normal to Flat affect, pleasant, cooperative, not responding to internal stimulus, coherent but tangential thought.    ED Course, Procedures, & Data      Procedures                     Results for orders placed or performed during the hospital encounter of 09/22/23   Drug Abuse Screen Qual Urine     Status: Abnormal   Result Value Ref Range    Amphetamines Urine Screen Negative Screen Negative    Barbituates Urine Screen Negative Screen Negative    Benzodiazepine Urine Screen Negative Screen Negative    Cannabinoids Urine Screen Positive (A) Screen Negative    Cocaine Urine Screen Negative Screen Negative    Fentanyl Qual Urine Screen Negative Screen Negative    Opiates Urine " Screen Negative Screen Negative    PCP Urine Screen Positive (A) Screen Negative   Urine Drugs of Abuse Screen     Status: Abnormal    Narrative    The following orders were created for panel order Urine Drugs of Abuse Screen.  Procedure                               Abnormality         Status                     ---------                               -----------         ------                     Drug Abuse Screen Qual U...[[id number]]  Abnormal            Final result                 Please view results for these tests on the individual orders.     Medications - No data to display  Labs Ordered and Resulted from Time of ED Arrival to Time of ED Departure   DRUG ABUSE SCREEN QUAL URINE - Abnormal       Result Value    Amphetamines Urine Screen Negative      Barbituates Urine Screen Negative      Benzodiazepine Urine Screen Negative      Cannabinoids Urine Screen Positive (*)     Cocaine Urine Screen Negative      Fentanyl Qual Urine Screen Negative      Opiates Urine Screen Negative      PCP Urine Screen Positive (*)      No orders to display              Assessment & Plan    History of substance use, hypertension and baseline stuttering worsened by PCP consumption who presents for inpatient chemical dependency treatment. Comes in endorsing intoxication from polysubstance use. Here, the patient is afebrile with stable vital signs albeit elevated blood pressure to 174/99, but reassuring with no complaints of chest pain or dyspnea with unremarkable exam, no tremors or diaphoresis. Low concern for symphomimetic toxidrome. Some delayed speech and stuttering which is common for him given substantial level of substance use which is reassuring against stroke especially given lack of cognitive deficits. Patient was interviewed by [facility] who offered referral. Patient is starting to sober up more and providing better history, quite genuine about becoming sober and following up. He can be discharged to his grandmother's home  with close follow up with mental health/addiction services.     I have reviewed the nursing notes. I have reviewed the findings, diagnosis, plan and need for follow up with the patient.    Current Discharge Medication List          Final diagnoses:   Tetrahydrocannabinol (THC) dependence (H)   PCP dependence (H)   Adjustment disorder, unspecified type   Hypertension, unspecified type     Seymour Soni MD  [facility], PGY-1    --    ED Attending Physician Attestation    I Rachana Nagel MD, cared for this patient with the Resident. I have performed a history and physical examination of the patient and discussed management with the resident. I reviewed the resident's documentation above and agree with the documented findings and plan of care.    Summary of HPI, PE, ED Course   Patient is a 47 year old male evaluated in the emergency department for daily use of thc and pcp for years and finally seeking detox.  He denies si/hi/hallucinations. Exam and ED course notable for allowed to sober due to under influence of substances.  Has baseline stutter which he says is chronic.  Placed ed assessment but unable to see today.  Placed dec assessment and they scheduled ed assessment for next week.  Plan was for him to go him for weekend but he wants detox at [address] instead which was arranged.  I discussed with pharm d is recent med fills and we filled 3 day supply of his meds.  Discharged go [facility] detox. . After the completion of care in the emergency department, the patient was discharged.        Medical Decision Making  The patient's presentation was of moderate complexity (a chronic illness mild to moderate exacerbation, progression, or side effect of treatment).    The patient's evaluation involved:  an assessment requiring an independent historian (friend on phone advocating for detox)  ordering and/or review of 1 test(s) in this encounter (utox)  discussion of management or test  interpretation with another health professional (dec , pharm d regarding his recent meds)    The patient's management necessitated moderate risk (prescription drug management including medications given in the ED).          Rachana Nagel MD  Emergency Medicine     [facility]achana AMOS [facility] EMERGENCY DEPARTMENT  9/22/2023     Rachana Nagel MD  09/22/23 1949       Rachana Nagel MD  09/22/23 2011

## 2023-09-22 NOTE — DISCHARGE INSTRUCTIONS
"Go to [address] [place] [address] for detox.  They are holding a bed for you.      A 3 day supply of your medications will be sent with you.     Take your routine medications as prescribed. Surround yourself with a support network of people in your life that want you to stay sober until your referral with addiction services/mental health. Stay busy and occupied with other pleasurable activities such as video games, cards or TV to help pass time. If you are feeling depressed or lose interest in doing pleasurable activities, and feel like hurting yourself, please contact this phone line:   Your emotional health is important to us!  If you feel that you may hurt yourself or others, please seek help through the hospital ER, or 9-1-1.  You may also call [facility], toll-free, at [phone] for immediate support.      The National Suicide Prevention Lifeline at  988 or 1-610.331.1439 can be reached 24/7.     Trans Lifeline can be reached at 770-433-3804.   For LGBT youth (ages 24 and younger) contemplating suicide, the quickhuddle Project Lifeline can be reached at 1-857.718.5222.  Jerome Program: Text \"start\" to 118 238      You have been scheduled with the [facility] for a Substance Use Disorder assessment appointment on Wednesday, 10/4 at 8:00AM. Please allow up to 90 -120 minutes for your appointment. This is an IN-PERSON appointment.    [facility] - [address] [address]    Please come to the [facility] entrance near the Emergency Department.  Follow the signs to the Emergency Room and park in the RED or YELLOW parking ramp.  Enter in the [facility].  The Assessment Center is next to [address].    -If getting a ride, please request drop off at the address above.  -If driving, discounted parking is available in the Red or Yellow ramp across from the [facility] entrance.    Our reception area is conveniently located as " you come into the [facility] in [address], next door to [address].  Please bring a current list of medication and contact information for your other providers.    IMPORTANT: If you need to change your appointment, please call Behavioral Access [phone].      ADDITIONAL Substance Use Disorder Direct Access Resources    It is recommended that you abstain from all mood altering chemicals. Please contact the sober support hotline (304-285-2602) as needed; phones are answered 24 hours a day, 7 days a week.    To access substance use treatment you must have a comprehensive assessment completed to begin any treatment program.     If uninsured, please contact your county of residence for eligibility screen to substance use disorder evaluation and treatment:    [place] - [phone]   [place] - [phone]   [facility] [phone]   [place] - [phone]   [facility] [phone]   [place] - [phone]   [facility] [phone]   [place] - [phone]     If you have private insurance, call the customer service number on the back of your insurance card to find an in-network substance abuse use disorder assessment. The ideal provider will be a treatment facility, licensed in the [facility].     Community CAMMIE Evaluations: Clients may call their county for a full list of providers - Availability and services listed belo are subject to change, please call the provider to confirm    [facility]  [phone]  [address] [place], [place], [address]  *Please call the above number to schedule a comprehensive assessment for determination of level of care needs. In person and virtual appointments available Mon-Fri.    [facility]  [facility], [address] First Floor, [address], [place] [address] (next to the outpatient lab)    Phone: [phone]   Provides bridging services to people with Opiate Use Disorders (OUD) seeking care. This is a front door to Medication  Assisted Treatments (MAT), ages 16+  Walk In hours: Monday-Friday 9:00am-3:00pm    [facility]  [phone]  Walk in Assessments: Mon-Friday 7a-1:45p  [address] [facility], [address]    [facility]  Central Access [phone]  [address] [place], [place], [address]  *by appointment only    Jann  [phone] (phone consultation available )  Locations in: [place], [place], [facility], and [place], [place]  Mauritanian virtual IOP [phone] or visit [employer]/ELIZABETH   Also offers LGBTQ programming     [facility]  [phone]  [address] [facility]  [place], [place], [address]  *Currently only offered via telehealth - call to set up an appointment    [facility]  [address] [place] [address], [address]  [facility]  For more information to to make a referral call:  [phone]  Walk-in on   9-11 a.m.    [facility]  [phone]  [address] [place], [place], [address]  *available by appointments only    Beatris Wei  [facility]  [phone]  [address] [place], [place], [address]  *available by appointment only    [facility]  [phone]  [address] [place], [place], [address]  *walk in assessments available M-F starting at 7 am.    [facility]  [phone]  Locations: [facility], [facility], and [place]  *Walk in assessments availble M-F starting at 8 am -virtual only    [facility]  [phone]  [address] [place] [address]    [facility]  Virtual + Locations: [place], [place], [place], [place], [facility], [place], [place], [place]   [phone]  *available by appointment only    [facility]  [phone]  [facility] [place], [address]    [facility]  [phone]  [address]  [place], [place], [address]  *available by appointment    [facility]  [phone]  [address] [place], [place], [address]  *walk ins available M-TH from 9-3    [facility]  MAT program: [phone]  [address] [place], [place], [address]    [place]  [phone]  Same day substance use disorder assessments are available Monday - Friday, via walk-in or by appointment at the [place] location.  [address], [address], [place] [address]     [facility] - adolescent and adult SUDs services  [phone]  Offer services Monday through Friday, as well as evening hours Monday through Thursday. Normally, a first appointment will be scheduled within one week  [email or web address]  Locations all over [place]    If you are intoxicated, you may be required to detox at a detox facility before starting treatment. The following are detox facilities that you can self present to. All detox facilities are able to help you complete an assessment prior to discharge if you choose:    [facility]: Arrive at a [facility] for immediate medical evaluation    [facility]: [address] [place], [place], [address].         [phone]    [facility]: [address] [place], [place], [address]  [phone]     [facility] ([facility]): [address] [place], [place], [address] [phone]     [facility]: [address] [place], [address], [phone]         Ways to help cope with sobriety:    -- Take prescribed medicines as scheduled  -- Keep follow-up appointments  -- Talk to others about your concerns  -- Get regular exercise  -- Practice deep breathing skills  -- Eat a healthy diet  -- Use community resources, including hotline numbers, [facility] crisis and support meetings  -- Stay sober and avoid places/people/things associated with substance use  --Maintain a daily schedule/routine  --Get at  least 7-8 hours of sleep per night  --Create a list 10--20 healthy activities that you can do that are enjoyable and do not involve substance use  --Create daily goals (approx. 1-4 goals) per day and work to achieve them throughout the day.       Free Resources:    [facility] ([facility])  [facility] connects people seeking recovery to resources that help foster and sustain long-term recovery. Whether you are seeking resources for treatment, transportation, housing, job training, education, health care or other pathways to recovery, [facility] is a great place to start.  Phone: 234.800.7510. www.[employer] (Great listing of all types of recovery and non-recovery related resources)    Alcoholics Anonymous  Phone: 8-960-ALCOHOL  Website: HTTP://WWW.AA.ORG/  [facility] [place] (731-487-4227 or http://aaTransaq.org)  [facility] [place] (833-865-7715 or www.aastpauRed Bag Solutions.org)     Narcotics Anonymous  Phone: 231.313.7494  Website: www.Cloud[employer].Contin[employer] Control.    [facility] [address], [place], [place],  Phone: [phone]  Drop-in Hours: Monday-Friday 9-11:30 am. By appointment at other times.  Provides: [facility] is a drop-in center on the [facility] side [facility] that provides a safe space for individuals who are homeless and have a history of chemical use. Sobriety is not a requirement but drugs and alcohol are not allowed on the property.  Services: Non-clients can access drop-in services such as Recovery and Harm Reduction Groups, referrals to case management, community activities, shower facilities, and a pool table. Individuals who are homeless and have chemical health needs may be eligible for enrollment into [facility]'s case management program. Clients and  work together to access benefits, treatment, health care, shelter, and external housing resources.          Aftercare Plan  If I am feeling unsafe or I am in a crisis, I will:   Contact my established care providers  "  Call the National Suicide Prevention Lifeline: 988  Go to the nearest emergency room   Call 911     Warning signs that I or other people might notice when a crisis is developing for me: Thoughts of wanting to hurt self or others with intent or plans    Things I am able to do on my own to cope or help me feel better: Do activities I like, exercise, take cold or hot bath/shower     Things that I am able to do with others to cope or help me better: talk with friends and family      Things I can use or do for distraction:  Do activities I like, listen to music, watch tv.    Changes I can make to support my mental health and wellness: Follow up with CAMMIE assessment.    People in my life that I can ask for help: Brother, grandma    Your [facility] has a mental health crisis team you can call 24/7: [facility] Mobile Crisis  [phone]           Crisis Lines  Crisis Text Line  Text 671702  You will be connected with a trained live crisis counselor to provide support.    Por espanol, texto  MIGUEL a 863906 o texto a 442-AYUDAME en WhatsApp    The Jerome Project (LGBTQ Youth Crisis Line)  6.236.781.0355  text START to 569-501      Community Resources  Fast Tracker  Linking people to mental health and substance use disorder resources  Stabilitech.org     [place] [facility] Warm Line  Peer to peer support  Monday thru Saturday, 12 pm to 10 pm  275.994.8471 or 2.504.941.8708  Text \"Support\" to 86062    National [place] on Mental Illness (REBECA)  065.218.5384 or 1.888.REBECA.HELPS      Mental Health Apps  My3  https://my3app.org/    VirtualHopeBox  https://[employer].org/apps/virtual-hope-box/      Additional Information  Today you were seen by a licensed mental health professional through Triage and Transition services, [facility] ([facility])  for a crisis assessment in the Emergency Department at [facility].  It is recommended that you follow up with your established providers " (psychiatrist, mental health therapist, and/or primary care doctor - as relevant) as soon as possible. Coordinators from [facility] will be calling you in the next 24-48 hours to ensure that you have the resources you need.  You can also contact [facility] coordinators directly at [phone]. You may have been scheduled for or offered an appointment with a mental health provider. [facility] maintains an extensive network of licensed behavioral health providers to connect patients with the services they need.  We do not charge providers a fee to participate in our referral network.  We match patients with providers based on a patient's specific needs, insurance coverage, and location.  Our first effort will be to refer you to a provider within your care system, and will utilize providers outside your care system as needed.

## 2023-09-22 NOTE — ED TRIAGE NOTES
Patient present seeking help with addiction to PCP and marijuana. Patient last used today at 0900. Patient is having difficulty finding words and stuttering. Patient denies alcohol use or tobacco use. Patient denies history of seizures.Patient reports using PCP daily.    Triage Assessment       Row Name 09/22/23 1121       Triage Assessment (Adult)    Airway WDL WDL       Respiratory WDL    Respiratory WDL WDL       Skin Circulation/Temperature WDL    Skin Circulation/Temperature WDL WDL       Cardiac WDL    Cardiac WDL WDL       Peripheral/Neurovascular WDL    Peripheral Neurovascular WDL WDL       Cognitive/Neuro/Behavioral WDL    Cognitive/Neuro/Behavioral WDL X    Level of Consciousness alert    Arousal Level opens eyes spontaneously    Orientation oriented x 4    Speech word-finding difficulty    Mood/Behavior flat affect;cooperative       [place] Coma Scale    Best Eye Response 4-->(E4) spontaneous    Best Motor Response 6-->(M6) obeys commands    Best Verbal Response 5-->(V5) oriented    Sayra Coma Scale Score 15

## 2023-10-02 ENCOUNTER — TELEPHONE (OUTPATIENT)
Dept: BEHAVIORAL HEALTH | Facility: CLINIC | Age: 48
End: 2023-10-02
Payer: COMMERCIAL

## 2023-10-02 NOTE — TELEPHONE ENCOUNTER
10/2/2023    Pt's phone went straight to voicemail. Left voice message regarding date and time of appt, and my number asking for a return call.    Raisa Stkoes [facility] - Patient Navigator   [email or web address]  Direct phone: [phone]

## 2023-10-03 NOTE — TELEPHONE ENCOUNTER
10/3/2023    Pt's phone went straight to voicemail. Did not leave a message.     Raisa [facility] - Patient Navigator   [email or web address]  Direct phone: [phone]

## 2024-02-26 ENCOUNTER — HOSPITAL ENCOUNTER (EMERGENCY)
Facility: CLINIC | Age: 49
Discharge: OTHER INSTITUTION | End: 2024-02-26
Attending: FAMILY MEDICINE | Admitting: FAMILY MEDICINE
Payer: COMMERCIAL

## 2024-02-26 VITALS
HEART RATE: 70 BPM | DIASTOLIC BLOOD PRESSURE: 86 MMHG | HEIGHT: 69 IN | OXYGEN SATURATION: 95 % | RESPIRATION RATE: 18 BRPM | TEMPERATURE: 98.3 F | BODY MASS INDEX: 33.19 KG/M2 | SYSTOLIC BLOOD PRESSURE: 154 MMHG | WEIGHT: 224.1 LBS

## 2024-02-26 DIAGNOSIS — F16.20: ICD-10-CM

## 2024-02-26 LAB
ALBUMIN SERPL BCG-MCNC: 3.7 G/DL (ref 3.5–5.2)
ALP SERPL-CCNC: 86 U/L (ref 40–150)
ALT SERPL W P-5'-P-CCNC: 38 U/L (ref 0–70)
AMPHETAMINES UR QL SCN: ABNORMAL
ANION GAP SERPL CALCULATED.3IONS-SCNC: 8 MMOL/L (ref 7–15)
AST SERPL W P-5'-P-CCNC: 33 U/L (ref 0–45)
ATRIAL RATE - MUSE: 76 BPM
BARBITURATES UR QL SCN: ABNORMAL
BASOPHILS # BLD AUTO: 0 10E3/UL (ref 0–0.2)
BASOPHILS NFR BLD AUTO: 1 %
BENZODIAZ UR QL SCN: ABNORMAL
BILIRUB SERPL-MCNC: 0.3 MG/DL
BUN SERPL-MCNC: 13.2 MG/DL (ref 6–20)
BZE UR QL SCN: ABNORMAL
CALCIUM SERPL-MCNC: 8.8 MG/DL (ref 8.6–10)
CANNABINOIDS UR QL SCN: ABNORMAL
CHLORIDE SERPL-SCNC: 104 MMOL/L (ref 98–107)
CREAT SERPL-MCNC: 1.18 MG/DL (ref 0.67–1.17)
DEPRECATED HCO3 PLAS-SCNC: 28 MMOL/L (ref 22–29)
DIASTOLIC BLOOD PRESSURE - MUSE: NORMAL MMHG
EGFRCR SERPLBLD CKD-EPI 2021: 77 ML/MIN/1.73M2
EOSINOPHIL # BLD AUTO: 0.3 10E3/UL (ref 0–0.7)
EOSINOPHIL NFR BLD AUTO: 4 %
ERYTHROCYTE [DISTWIDTH] IN BLOOD BY AUTOMATED COUNT: 13.2 % (ref 10–15)
ETHANOL SERPL-MCNC: <0.01 G/DL
FENTANYL UR QL: ABNORMAL
GLUCOSE SERPL-MCNC: 140 MG/DL (ref 70–99)
HCT VFR BLD AUTO: 39.4 % (ref 40–53)
HGB BLD-MCNC: 12.7 G/DL (ref 13.3–17.7)
IMM GRANULOCYTES # BLD: 0 10E3/UL
IMM GRANULOCYTES NFR BLD: 1 %
INTERPRETATION ECG - MUSE: NORMAL
LYMPHOCYTES # BLD AUTO: 2.2 10E3/UL (ref 0.8–5.3)
LYMPHOCYTES NFR BLD AUTO: 34 %
MAGNESIUM SERPL-MCNC: 2 MG/DL (ref 1.7–2.3)
MCH RBC QN AUTO: 28.3 PG (ref 26.5–33)
MCHC RBC AUTO-ENTMCNC: 32.2 G/DL (ref 31.5–36.5)
MCV RBC AUTO: 88 FL (ref 78–100)
MONOCYTES # BLD AUTO: 0.5 10E3/UL (ref 0–1.3)
MONOCYTES NFR BLD AUTO: 9 %
NEUTROPHILS # BLD AUTO: 3.3 10E3/UL (ref 1.6–8.3)
NEUTROPHILS NFR BLD AUTO: 51 %
NRBC # BLD AUTO: 0 10E3/UL
NRBC BLD AUTO-RTO: 0 /100
OPIATES UR QL SCN: ABNORMAL
P AXIS - MUSE: 42 DEGREES
PCP QUAL URINE (ROCHE): ABNORMAL
PLATELET # BLD AUTO: 256 10E3/UL (ref 150–450)
POTASSIUM SERPL-SCNC: 3.8 MMOL/L (ref 3.4–5.3)
PR INTERVAL - MUSE: 170 MS
PROT SERPL-MCNC: 6.5 G/DL (ref 6.4–8.3)
QRS DURATION - MUSE: 86 MS
QT - MUSE: 406 MS
QTC - MUSE: 456 MS
R AXIS - MUSE: -41 DEGREES
RBC # BLD AUTO: 4.49 10E6/UL (ref 4.4–5.9)
SODIUM SERPL-SCNC: 140 MMOL/L (ref 135–145)
SYSTOLIC BLOOD PRESSURE - MUSE: NORMAL MMHG
T AXIS - MUSE: 19 DEGREES
VENTRICULAR RATE- MUSE: 76 BPM
WBC # BLD AUTO: 6.4 10E3/UL (ref 4–11)

## 2024-02-26 PROCEDURE — 80307 DRUG TEST PRSMV CHEM ANLYZR: CPT | Performed by: FAMILY MEDICINE

## 2024-02-26 PROCEDURE — 83735 ASSAY OF MAGNESIUM: CPT | Performed by: FAMILY MEDICINE

## 2024-02-26 PROCEDURE — 258N000003 HC RX IP 258 OP 636: Performed by: FAMILY MEDICINE

## 2024-02-26 PROCEDURE — 96360 HYDRATION IV INFUSION INIT: CPT | Performed by: FAMILY MEDICINE

## 2024-02-26 PROCEDURE — 85025 COMPLETE CBC W/AUTO DIFF WBC: CPT | Performed by: FAMILY MEDICINE

## 2024-02-26 PROCEDURE — 36415 COLL VENOUS BLD VENIPUNCTURE: CPT | Performed by: FAMILY MEDICINE

## 2024-02-26 PROCEDURE — 82077 ASSAY SPEC XCP UR&BREATH IA: CPT | Performed by: FAMILY MEDICINE

## 2024-02-26 PROCEDURE — 99284 EMERGENCY DEPT VISIT MOD MDM: CPT | Performed by: FAMILY MEDICINE

## 2024-02-26 PROCEDURE — 93005 ELECTROCARDIOGRAM TRACING: CPT | Performed by: FAMILY MEDICINE

## 2024-02-26 PROCEDURE — 99284 EMERGENCY DEPT VISIT MOD MDM: CPT | Mod: 25 | Performed by: FAMILY MEDICINE

## 2024-02-26 PROCEDURE — 84155 ASSAY OF PROTEIN SERUM: CPT | Performed by: FAMILY MEDICINE

## 2024-02-26 RX ADMIN — SODIUM CHLORIDE 1000 ML: 9 INJECTION, SOLUTION INTRAVENOUS at 13:51

## 2024-02-26 ASSESSMENT — ACTIVITIES OF DAILY LIVING (ADL)
ADLS_ACUITY_SCORE: 35
ADLS_ACUITY_SCORE: 33
ADLS_ACUITY_SCORE: 35
ADLS_ACUITY_SCORE: 35

## 2024-02-26 ASSESSMENT — COLUMBIA-SUICIDE SEVERITY RATING SCALE - C-SSRS
6. HAVE YOU EVER DONE ANYTHING, STARTED TO DO ANYTHING, OR PREPARED TO DO ANYTHING TO END YOUR LIFE?: NO
2. HAVE YOU ACTUALLY HAD ANY THOUGHTS OF KILLING YOURSELF IN THE PAST MONTH?: NO
1. IN THE PAST MONTH, HAVE YOU WISHED YOU WERE DEAD OR WISHED YOU COULD GO TO SLEEP AND NOT WAKE UP?: NO

## 2024-02-26 NOTE — ED TRIAGE NOTES
"Patient arrives stating to need detox for PCP, but appears intoxicated and wants \"detox and treatment.\" He states his last use was this morning.      Triage Assessment (Adult)       Row Name 02/26/24 1236          Triage Assessment    Airway WDL WDL        Respiratory WDL    Respiratory WDL WDL        Skin Circulation/Temperature WDL    Skin Circulation/Temperature WDL WDL        Cardiac WDL    Cardiac WDL WDL        Peripheral/Neurovascular WDL    Peripheral Neurovascular WDL WDL        Cognitive/Neuro/Behavioral WDL    Cognitive/Neuro/Behavioral WDL WDL                     "

## 2024-02-27 NOTE — ED PROVIDER NOTES
"ED Provider Note  [facility]      History     Chief Complaint   Patient presents with    Alcohol Intoxication     Patient arrives stating to need detox for PCP, but appears intoxicated and wants \"detox and treatment.\" He states his last use was this morning.        HPI  Dayday Hester is a 48 year old male who presents emergency room noting that he has had ongoing repeated PCP abuse appears to be under the influence but denies any alcohol use or other drug use he states that he only abuses PCP and his last use was this morning.  Patient is interested in both detox and treatment he understands that there is no detox for PCP at [facility].    Past Medical History  No past medical history on file.  No past surgical history on file.  amLODIPine (NORVASC) 10 MG tablet  Amphetamine-Dextroamphetamine (ADDERALL PO)  atorvastatin (LIPITOR) 20 MG tablet  diclofenac (VOLTAREN) 75 MG EC tablet  EPINEPHrine (EPIPEN/ADRENACLICK/OR ANY BX GENERIC EQUIV) 0.3 MG/0.3ML injection 2-pack  gabapentin (NEURONTIN) 300 MG capsule  hydrocortisone 2.5 % cream  ibuprofen (ADVIL/MOTRIN) 600 MG tablet  tiZANidine (ZANAFLEX) 4 MG tablet  UNABLE TO FIND      Allergies   Allergen Reactions    Bee Venom Anaphylaxis    Bees     Food Rash     Family History  No family history on file.  Social History   Social History     Tobacco Use    Smoking status: Former     Packs/day: 1     Types: Cigarettes    Smokeless tobacco: Never   Substance Use Topics    Alcohol use: Not Currently    Drug use: Yes     Types: PCP         A medically appropriate review of systems was performed with pertinent positives and negatives noted in the HPI, and all other systems negative.    Physical Exam   BP: (!) 197/123  Pulse: 81  Temp: 98.3  F (36.8  C)  Resp: 18  Height: 175.3 cm (5' 9\")  Weight: 101.7 kg (224 lb 1.6 oz)  SpO2: 98 %  Physical Exam  Constitutional:       General: He is not in acute distress.     Appearance: Normal appearance. He is not " toxic-appearing.   HENT:      Head: Atraumatic.   Eyes:      General: No scleral icterus.     Conjunctiva/sclera: Conjunctivae normal.   Cardiovascular:      Rate and Rhythm: Normal rate.      Heart sounds: Normal heart sounds.   Pulmonary:      Effort: Pulmonary effort is normal. No respiratory distress.      Breath sounds: Normal breath sounds.   Abdominal:      Palpations: Abdomen is soft.      Tenderness: There is no abdominal tenderness.   Musculoskeletal:         General: No deformity.      Cervical back: Neck supple.   Skin:     General: Skin is warm.   Neurological:      General: No focal deficit present.      Mental Status: He is alert and oriented to person, place, and time.      Sensory: No sensory deficit.      Motor: No weakness.      Coordination: Coordination normal.   Psychiatric:         Mood and Affect: Mood is anxious.         Speech: Speech is slurred.         Behavior: Behavior is cooperative.         Thought Content: Thought content does not include homicidal or suicidal ideation.           ED Course, Procedures, & Data      Procedures          Results for orders placed or performed during the hospital encounter of 02/26/24   Comprehensive metabolic panel     Status: Abnormal   Result Value Ref Range    Sodium 140 135 - 145 mmol/L    Potassium 3.8 3.4 - 5.3 mmol/L    Carbon Dioxide (CO2) 28 22 - 29 mmol/L    Anion Gap 8 7 - 15 mmol/L    Urea Nitrogen 13.2 6.0 - 20.0 mg/dL    Creatinine 1.18 (H) 0.67 - 1.17 mg/dL    GFR Estimate 77 >60 mL/min/1.73m2    Calcium 8.8 8.6 - 10.0 mg/dL    Chloride 104 98 - 107 mmol/L    Glucose 140 (H) 70 - 99 mg/dL    Alkaline Phosphatase 86 40 - 150 U/L    AST 33 0 - 45 U/L    ALT 38 0 - 70 U/L    Protein Total 6.5 6.4 - 8.3 g/dL    Albumin 3.7 3.5 - 5.2 g/dL    Bilirubin Total 0.3 <=1.2 mg/dL   Magnesium     Status: Normal   Result Value Ref Range    Magnesium 2.0 1.7 - 2.3 mg/dL   Ethyl Alcohol Level     Status: Normal   Result Value Ref Range    Alcohol ethyl  <0.01 <=0.01 g/dL   CBC with platelets and differential     Status: Abnormal   Result Value Ref Range    WBC Count 6.4 4.0 - 11.0 10e3/uL    RBC Count 4.49 4.40 - 5.90 10e6/uL    Hemoglobin 12.7 (L) 13.3 - 17.7 g/dL    Hematocrit 39.4 (L) 40.0 - 53.0 %    MCV 88 78 - 100 fL    MCH 28.3 26.5 - 33.0 pg    MCHC 32.2 31.5 - 36.5 g/dL    RDW 13.2 10.0 - 15.0 %    Platelet Count 256 150 - 450 10e3/uL    % Neutrophils 51 %    % Lymphocytes 34 %    % Monocytes 9 %    % Eosinophils 4 %    % Basophils 1 %    % Immature Granulocytes 1 %    NRBCs per 100 WBC 0 <1 /100    Absolute Neutrophils 3.3 1.6 - 8.3 10e3/uL    Absolute Lymphocytes 2.2 0.8 - 5.3 10e3/uL    Absolute Monocytes 0.5 0.0 - 1.3 10e3/uL    Absolute Eosinophils 0.3 0.0 - 0.7 10e3/uL    Absolute Basophils 0.0 0.0 - 0.2 10e3/uL    Absolute Immature Granulocytes 0.0 <=0.4 10e3/uL    Absolute NRBCs 0.0 10e3/uL   Urine Drug Screen Panel     Status: Abnormal   Result Value Ref Range    Amphetamines Urine Screen Negative Screen Negative    Barbituates Urine Screen Negative Screen Negative    Benzodiazepine Urine Screen Negative Screen Negative    Cannabinoids Urine Screen Negative Screen Negative    Cocaine Urine Screen Negative Screen Negative    Fentanyl Qual Urine Screen Negative Screen Negative    Opiates Urine Screen Negative Screen Negative    PCP Urine Screen Positive (A) Screen Negative   EKG 12-lead, tracing only     Status: None   Result Value Ref Range    Systolic Blood Pressure  mmHg    Diastolic Blood Pressure  mmHg    Ventricular Rate 76 BPM    Atrial Rate 76 BPM    SC Interval 170 ms    QRS Duration 86 ms     ms    QTc 456 ms    P Axis 42 degrees    R AXIS -41 degrees    T Axis 19 degrees    Interpretation ECG       Sinus rhythm  Left axis deviation  Abnormal ECG  Unconfirmed report - interpretation of this ECG is computer generated - see medical record for final interpretation  Confirmed by - EMERGENCY ROOM, PHYSICIAN (Michael),  TIARA LEE  ([id number]) on 2/26/2024 6:16:18 PM     CBC with platelets differential     Status: Abnormal    Narrative    The following orders were created for panel order CBC with platelets differential.  Procedure                               Abnormality         Status                     ---------                               -----------         ------                     CBC with platelets and d...[[id number]]  Abnormal            Final result                 Please view results for these tests on the individual orders.   Urine Drug Screen     Status: Abnormal    Narrative    The following orders were created for panel order Urine Drug Screen.  Procedure                               Abnormality         Status                     ---------                               -----------         ------                     Urine Drug Screen Panel[[id number]]      Abnormal            Final result                 Please view results for these tests on the individual orders.     Medications   sodium chloride 0.9% BOLUS 1,000 mL (0 mLs Intravenous Stopped 2/26/24 1517)     Labs Ordered and Resulted from Time of ED Arrival to Time of ED Departure   COMPREHENSIVE METABOLIC PANEL - Abnormal       Result Value    Sodium 140      Potassium 3.8      Carbon Dioxide (CO2) 28      Anion Gap 8      Urea Nitrogen 13.2      Creatinine 1.18 (*)     GFR Estimate 77      Calcium 8.8      Chloride 104      Glucose 140 (*)     Alkaline Phosphatase 86      AST 33      ALT 38      Protein Total 6.5      Albumin 3.7      Bilirubin Total 0.3     CBC WITH PLATELETS AND DIFFERENTIAL - Abnormal    WBC Count 6.4      RBC Count 4.49      Hemoglobin 12.7 (*)     Hematocrit 39.4 (*)     MCV 88      MCH 28.3      MCHC 32.2      RDW 13.2      Platelet Count 256      % Neutrophils 51      % Lymphocytes 34      % Monocytes 9      % Eosinophils 4      % Basophils 1      % Immature Granulocytes 1      NRBCs per 100 WBC 0      Absolute Neutrophils 3.3      Absolute  Lymphocytes 2.2      Absolute Monocytes 0.5      Absolute Eosinophils 0.3      Absolute Basophils 0.0      Absolute Immature Granulocytes 0.0      Absolute NRBCs 0.0     URINE DRUG SCREEN PANEL - Abnormal    Amphetamines Urine Screen Negative      Barbituates Urine Screen Negative      Benzodiazepine Urine Screen Negative      Cannabinoids Urine Screen Negative      Cocaine Urine Screen Negative      Fentanyl Qual Urine Screen Negative      Opiates Urine Screen Negative      PCP Urine Screen Positive (*)    MAGNESIUM - Normal    Magnesium 2.0     ETHYL ALCOHOL LEVEL - Normal    Alcohol ethyl <0.01       No orders to display          Critical care was not performed.     Medical Decision Making  The patient's presentation was of moderate complexity (a chronic illness mild to moderate exacerbation, progression, or side effect of treatment).    The patient's evaluation involved:  ordering and/or review of 3+ test(s) in this encounter (see separate area of note for details)    The patient's management necessitated moderate risk (limitations due to social determinants of health (patient requiring detox unfortunately there is no availability for inpatient detox for PCP I was able to get a bed for him at [facility] patient will be transported by cab to [facility] not able to drive because he is intoxicated.)).    Assessment & Plan        I have reviewed the nursing notes. I have reviewed the findings, diagnosis, plan and need for follow up with the patient.        Final diagnoses:   PCP dependence (H)         M [facility] EMERGENCY DEPARTMENT  2/26/2024     Adams Harmon MD  02/27/24 [id number]

## 2024-10-12 ENCOUNTER — HEALTH MAINTENANCE LETTER (OUTPATIENT)
Age: 49
End: 2024-10-12